# Patient Record
Sex: FEMALE | Race: WHITE | NOT HISPANIC OR LATINO | Employment: FULL TIME | URBAN - METROPOLITAN AREA
[De-identification: names, ages, dates, MRNs, and addresses within clinical notes are randomized per-mention and may not be internally consistent; named-entity substitution may affect disease eponyms.]

---

## 2017-03-01 ENCOUNTER — TELEPHONE (OUTPATIENT)
Dept: FAMILY MEDICINE | Facility: CLINIC | Age: 27
End: 2017-03-01

## 2017-03-01 ENCOUNTER — OFFICE VISIT (OUTPATIENT)
Dept: FAMILY MEDICINE | Facility: CLINIC | Age: 27
End: 2017-03-01
Payer: COMMERCIAL

## 2017-03-01 VITALS
OXYGEN SATURATION: 98 % | TEMPERATURE: 97.9 F | BODY MASS INDEX: 21.66 KG/M2 | HEART RATE: 108 BPM | WEIGHT: 130 LBS | HEIGHT: 65 IN

## 2017-03-01 DIAGNOSIS — R09.81 NASAL CONGESTION: ICD-10-CM

## 2017-03-01 DIAGNOSIS — R50.9 FEVER, UNSPECIFIED: ICD-10-CM

## 2017-03-01 DIAGNOSIS — J11.1 INFLUENZA-LIKE ILLNESS: Primary | ICD-10-CM

## 2017-03-01 DIAGNOSIS — R05.9 COUGH: ICD-10-CM

## 2017-03-01 DIAGNOSIS — R52 BODY ACHES: ICD-10-CM

## 2017-03-01 PROCEDURE — 99213 OFFICE O/P EST LOW 20 MIN: CPT | Performed by: PHYSICIAN ASSISTANT

## 2017-03-01 NOTE — MR AVS SNAPSHOT
After Visit Summary   3/1/2017    Skye Maciel    MRN: [id number]           Patient Information     Date Of Birth          1990        Visit Information        Provider Department      3/1/2017 2:40 PM Celine Mendez [facility]-C [facility]        Today's Diagnoses     Influenza-like illness    -  1    Fever, unspecified        Cough        Body aches        Nasal congestion          Care Instructions    This is likely influenza given daughter here today with same symptoms and positive test.  Given this pt declines further testing for herself.  This is a viral process and treatment is supportive.  Reviewed expected duration, potential complications, public health risks and hygiene measures.   Re-check anytime with failure to improve as expected or with new concerns.    Electronically Signed By: Celine Mendez PA-C        Follow-ups after your visit        Who to contact     If you have questions or need follow up information about today's clinic visit or your schedule please contact [facility] directly at [phone].  Normal or non-critical lab and imaging results will be communicated to you by [employer], letter or phone within 4 business days after the clinic has received the results. If you do not hear from us within 7 days, please contact the clinic through [employer] or phone. If you have a critical or abnormal lab result, we will notify you by phone as soon as possible.  Submit refill requests through [employer] or call your pharmacy and they will forward the refill request to us. Please allow 3 business days for your refill to be completed.          Additional Information About Your Visit        MyChart Information     [employer] gives you secure access to your electronic health record. If you see a primary care provider, you can also send messages to your care team and make appointments. If you have questions, please call your primary care clinic.  If you do  "not have a primary care provider, please call [phone] and they will assist you.        Care EveryWhere ID     This is your Care EveryWhere ID. This could be used by other organizations to access your [place] medical records  [id number]        Your Vitals Were     Pulse Temperature Height Pulse Oximetry Breastfeeding? BMI (Body Mass Index)    108 97.9  F (36.6  C) (Oral) 5' 5\" (1.651 m) 98% No 21.63 kg/m2       Blood Pressure from Last 3 Encounters:   11/30/16 114/82   01/15/16 122/88   11/11/15 120/70    Weight from Last 3 Encounters:   03/01/17 130 lb (59 kg)   11/30/16 139 lb (63 kg)   01/15/16 136 lb 11.2 oz (62 kg)              Today, you had the following     No orders found for display         Today's Medication Changes          These changes are accurate as of: 3/1/17 11:59 PM.  If you have any questions, ask your nurse or doctor.               Stop taking these medicines if you haven't already. Please contact your care team if you have questions.     NO ACTIVE MEDICATIONS   Stopped by:  Celine Mendez PA-C                    Primary Care Provider Office Phone # Fax #    Kemar Aguiar [phone]       [id number]  [facility] [id number]        Thank you!     Thank you for choosing [facility]  for your care. Our goal is always to provide you with excellent care. Hearing back from our patients is one way we can continue to improve our services. Please take a few minutes to complete the written survey that you may receive in the mail after your visit with us. Thank you!             Your Updated Medication List - Protect others around you: Learn how to safely use, store and throw away your medicines at www.disposemymeds.org.          This list is accurate as of: 3/1/17 11:59 PM.  Always use your most recent med list.                   Brand Name Dispense Instructions for use    ibuprofen 400-800 mg tablet    ADVIL,MOTRIN    90 tablet    Take 1-2 tablets by " mouth every 6 hours as needed (cramping).       norethindrone-ethinyl estradiol 1.5-30 MG-MCG per tablet    LOESTRIN 1.5/30 (21)    84 tablet    Take 1 tablet by mouth daily

## 2017-03-01 NOTE — PROGRESS NOTES
SUBJECTIVE:                                                    Skye Maciel is a 27 year old female who presents to clinic today for the following health issues:      Acute Illness   Acute illness concerns: cough, chlls, sore throat  Onset: since Sunday night and Monday morning - 103 max temp.  Occasionally smokes.      Fever: no    Chills/Sweats: YES    Headache (location?): YES    Sinus Pressure:YES    Conjunctivitis:  no    Ear Pain: no    Rhinorrhea: no    Congestion: YES    Sore Throat: YES     Cough: YES    Wheeze: no    Decreased Appetite: no    Nausea: no    Vomiting: no    Diarrhea:  no    Dysuria/Freq.: no    Fatigue/Achiness: YES    Sick/Strep Exposure: YES     Therapies Tried and outcome:     Problem list and histories reviewed & adjusted, as indicated.  Additional history: as documented    Patient Active Problem List   Diagnosis     Supervision of normal first pregnancy     CARDIOVASCULAR SCREENING; LDL GOAL LESS THAN 130     Encounter for supervision of other normal pregnancy     Papanicolaou smear of vagina with atypical squamous cells cannot exclude high grade squamous intraepithelial lesion (ASC-H)     Active labor     Postpartum state     Past Surgical History   Procedure Laterality Date     Hc tooth extraction w/forcep  5/06     wisdom teeth     Surgical history of -   4/07     jaw surgery       Social History   Substance Use Topics     Smoking status: Current Every Day Smoker     Packs/day: 0.25     Types: Cigarettes     Smokeless tobacco: Current User      Comment: Pt. smokes appx. 5 cigarettes daily, trying to quit while pregnant     Alcohol use Yes      Comment: occ     Family History   Problem Relation Age of Onset     Hypertension Father      C.A.D. Paternal Grandmother      MI     CANCER Paternal Aunt      ovarian, 50 yrs old         Current Outpatient Prescriptions   Medication Sig Dispense Refill     norethindrone-ethinyl estradiol (LOESTRIN 1.5/30, 21,) 1.5-30 MG-MCG per tablet  "Take 1 tablet by mouth daily (Patient not taking: Reported on 3/1/2017) 84 tablet 4     ibuprofen (ADVIL,MOTRIN) 400-800 mg tablet Take 1-2 tablets by mouth every 6 hours as needed (cramping). 90 tablet      Allergies   Allergen Reactions     No Known Drug Allergies        Reviewed and updated as needed this visit by clinical staff  Tobacco  Allergies  Meds  Med Hx  Surg Hx  Fam Hx  Soc Hx      Reviewed and updated as needed this visit by Provider  Tobacco  Allergies  Meds  Med Hx  Surg Hx  Fam Hx  Soc Hx        ROS:  Constitutional, HEENT, cardiovascular, pulmonary, gi and gu systems are negative, except as otherwise noted.    OBJECTIVE:                                                    Pulse 108  Temp 97.9  F (36.6  C) (Oral)  Ht 5' 5\" (1.651 m)  Wt 130 lb (59 kg)  SpO2 98%  Breastfeeding? No  BMI 21.63 kg/m2  Body mass index is 21.63 kg/(m^2).  GENERAL: healthy, alert and no distress  EYES: Eyes grossly normal to inspection, PERRL and conjunctivae and sclerae normal  HENT: ear canals and TM's normal, nose and mouth without ulcers or lesions. Mild congestion and rhinorrhea.  NECK: no adenopathy, no asymmetry, masses, or scars and thyroid normal to palpation  RESP: lungs clear to auscultation - no rales, rhonchi or wheezes  CV: regular rate and rhythm, normal S1 S2, no S3 or S4, no murmur, click or rub, no peripheral edema and peripheral pulses strong    Diagnostic Test Results:  none      ASSESSMENT/PLAN:                                                        ICD-10-CM    1. Influenza-like illness R69    2. Fever, unspecified R50.9    3. Cough R05    4. Body aches R52    5. Nasal congestion R09.81      See Patient Instructions  Patient Instructions   This is likely influenza given daughter here today with same symptoms and positive test.  Given this pt declines further testing for herself.  This is a viral process and treatment is supportive.  Reviewed expected duration, potential complications, " public health risks and hygiene measures.   Re-check anytime with failure to improve as expected or with new concerns.    Electronically Signed By: Celine Mendez PA-C

## 2017-03-01 NOTE — NURSING NOTE
"Chief Complaint   Patient presents with     URI       Initial Pulse 108  Temp 97.9  F (36.6  C) (Oral)  Ht 5' 5\" (1.651 m)  Wt 130 lb (59 kg)  SpO2 98%  Breastfeeding? No  BMI 21.63 kg/m2 Estimated body mass index is 21.63 kg/(m^2) as calculated from the following:    Height as of this encounter: 5' 5\" (1.651 m).    Weight as of this encounter: 130 lb (59 kg).  Medication Reconciliation: complete    "

## 2017-03-01 NOTE — TELEPHONE ENCOUNTER
Pt notes daughter having cold/sinus/cough sx and wanting both of them to be seen today for similar symptoms. Scheduled with [place] clinic today for evaluation. Daughter was triaged.     Home Care Instructions for cold symptoms:  Take a pain reliever of choice for fever and discomfort.  Do not give Aspirin to child.  Avoid acetaminophen if liver disease is present.  Avoid ibuprofen if kidney disease or stomach problems exist or with pregnancy.  Follow the directions on the label.    Rest  Drink 6-8 glasses of liquids daily, especially warm liquids such as tea with lemon and honey.  Take a decongestant of choice for congestion (unless history of hypertension)  Take expectorant of choice for cough.    Use saline nose drops as needed for nasal congestion.    Use a vaporizer or humidifier to keep air moist, especially at night, and change the water daily.  If throat is sore, gargle several times a day with warm water.  Use frozen cough drops or hard candy, or sip warm chicken broth for additional relief.  Avoid smoking and exposure to second-hand smoke.    Please report the following to the clinic:  Persistent fever greater than 3 days or temperature of 105 degrees F  Nasal discharge greater than 10 days  Persistent earache, sinus pain, or yellow eye drainage  Productive cough or fever  Condition persists or worsens    Seek Emergency care Immediately:  Difficulty breathing for reasons other than nasal congestion  Severe chest pain.    Please let us know if you have any further questions.  You can contact our clinic at [phone]    ERNESTO Tolentino RN

## 2017-06-03 ENCOUNTER — HEALTH MAINTENANCE LETTER (OUTPATIENT)
Age: 27
End: 2017-06-03

## 2017-06-27 ENCOUNTER — OFFICE VISIT (OUTPATIENT)
Dept: FAMILY MEDICINE | Facility: CLINIC | Age: 27
End: 2017-06-27
Payer: COMMERCIAL

## 2017-06-27 VITALS
TEMPERATURE: 98 F | HEART RATE: 100 BPM | DIASTOLIC BLOOD PRESSURE: 70 MMHG | BODY MASS INDEX: 21.99 KG/M2 | WEIGHT: 132 LBS | OXYGEN SATURATION: 100 % | SYSTOLIC BLOOD PRESSURE: 110 MMHG | HEIGHT: 65 IN

## 2017-06-27 DIAGNOSIS — H61.23 BILATERAL IMPACTED CERUMEN: Primary | ICD-10-CM

## 2017-06-27 PROCEDURE — 99213 OFFICE O/P EST LOW 20 MIN: CPT | Performed by: PHYSICIAN ASSISTANT

## 2017-06-27 NOTE — PATIENT INSTRUCTIONS
Please followup with ENT for an ear lavage.  Your left ear looks clear and normal appearing, the right ear still have cerumen to clear.      Please followup in clinic with any concerns.  Please be seen immediately if symptoms change or worsen in any way.

## 2017-06-27 NOTE — PROGRESS NOTES
"  SUBJECTIVE:                                                    Skye Maciel is a 27 year old female who presents to clinic today for the following health issues:      Concern - Ear pain  Onset: x2 days    Description:   Left ear - pressure    Intensity: moderate    Progression of Symptoms:  worsening and constant    Accompanying Signs & Symptoms:  nothing    Previous history of similar problem:   nothing    Precipitating factors:   Worsened by: touching    Alleviating factors:  Improved by: nothing    Therapies Tried and outcome: nothing    Patient reports that she has had ear pain of the left ear for the past 2-3 days.  She reports that it feels like pressure.  She does have some pain when touching the ear as well.      She denies fevers, chills or sweats.  She denies any other symptoms.      Problem list and histories reviewed & adjusted, as indicated.  Additional history: as documented      Health Maintenance reviewed - Pt gets paps done with Dr. Aguiar. - will make an appt.      ROS:  Constitutional, HEENT, cardiovascular, pulmonary, GI, , musculoskeletal, neuro, skin, endocrine and psych systems are negative, except as otherwise noted.    OBJECTIVE:                                                    /70 (BP Location: Left arm, Patient Position: Chair, Cuff Size: Adult Regular)  Pulse 100  Temp 98  F (36.7  C) (Oral)  Ht 5' 5\" (1.651 m)  Wt 132 lb (59.9 kg)  SpO2 100%  Breastfeeding? No  BMI 21.97 kg/m2  Body mass index is 21.97 kg/(m^2).  GENERAL: healthy, alert and no distress  EYES: Eyes grossly normal to inspection, PERRL and conjunctivae and sclerae normal  HENT: Impacted cerumen bilaterally, TM's not visualized on initial exam.  Left TM normal appearing without trauma or rupture after ear lavage, Right TM remains cerumen impacted.  Nose and mouth without ulcers or lesions  NECK: no adenopathy, no asymmetry, masses, or scars and thyroid normal to palpation  RESP: lungs clear to " auscultation - no rales, rhonchi or wheezes  CV: regular rate and rhythm, normal S1 S2, no S3 or S4, no murmur, click or rub, no peripheral edema and peripheral pulses strong  MS: no gross musculoskeletal defects noted, no edema  NEURO: Normal strength and tone, mentation intact and speech normal  PSYCH: mentation appears normal, affect normal/bright    Diagnostic Test Results:  none      ASSESSMENT/PLAN:                                                      Skye was seen today for otalgia.    Diagnoses and all orders for this visit:    Bilateral impacted cerumen  -     OTOLARYNGOLOGY REFERRAL  -     REMOVE IMPACTED CERUMEN      - On exam, patient was seen to have bilateral impacted cerumen.  Ear lavage was started.  Patient began to develop mild dizziness and nausea once cerumen was removed.  She was given an emesis bag and layed down.  Patient did not pass out, but was sweating and chilled.  BP was taken and was 100/72.  After about 5 minutes patient began to feel better, went to the restroom and was talking and communicating well.  She reported to feeling back to baseline and was ready for the right ear to be washed.  Right ear was not lavaged and referral was done to ENT for further management of impacted cerumen.  Patient had not eaten breakfast and therefore was given animal crackers and juice.  Patient self ambulated out of clinic without dizziness or unsteady gait.      - Patient reports an improvement in ear symptoms from when she came into clinic.    - Left ear was clear and without rupture or trauma from the ear wash.  Right ear still impacted with cerumen, but without symptoms, was advised to followup at ENT for further treatment.      - Patient to followup as advised in PP.      - Patient agreed with and understood the plan today.       See Patient Instructions:  Please followup with ENT for an ear lavage.  Your left ear looks clear and normal appearing, the right ear still have cerumen to clear.       Please followup in clinic with any concerns.  Please be seen immediately if symptoms change or worsen in any way.          Lexus Connor PA-C    [facility]

## 2017-06-27 NOTE — NURSING NOTE
"Chief Complaint   Patient presents with     Otalgia       Initial /70 (BP Location: Left arm, Patient Position: Chair, Cuff Size: Adult Regular)  Pulse 100  Temp 98  F (36.7  C) (Oral)  Ht 5' 5\" (1.651 m)  Wt 132 lb (59.9 kg)  SpO2 100%  Breastfeeding? No  BMI 21.97 kg/m2 Estimated body mass index is 21.97 kg/(m^2) as calculated from the following:    Height as of this encounter: 5' 5\" (1.651 m).    Weight as of this encounter: 132 lb (59.9 kg).  Medication Reconciliation: complete   Csaba Mlnarik CMA    "

## 2017-06-27 NOTE — MR AVS SNAPSHOT
After Visit Summary   6/27/2017    Skye Maciel    MRN: [id number]           Patient Information     Date Of Birth          1990        Visit Information        Provider Department      6/27/2017 8:00 AM Lexus Connor PA-C [facility]        Today's Diagnoses     Bilateral impacted cerumen    -  1      Care Instructions    Please followup with ENT for an ear lavage.  Your left ear looks clear and normal appearing, the right ear still have cerumen to clear.      Please followup in clinic with any concerns.  Please be seen immediately if symptoms change or worsen in any way.            Follow-ups after your visit        Additional Services     OTOLARYNGOLOGY REFERRAL       Your provider has referred you to: [facility]: [facility] [phone]   [email or web address]    Please be aware that coverage of these services is subject to the terms and limitations of your health insurance plan.  Call member services at your health plan with any benefit or coverage questions.      Please bring the following with you to your appointment:    (1) Any X-Rays, CTs or MRIs which have been performed.  Contact the facility where they were done to arrange for  prior to your scheduled appointment.   (2) List of current medications  (3) This referral request   (4) Any documents/labs given to you for this referral                  Who to contact     If you have questions or need follow up information about today's clinic visit or your schedule please contact [facility] directly at [phone].  Normal or non-critical lab and imaging results will be communicated to you by MyChart, letter or phone within 4 business days after the clinic has received the results. If you do not hear from us within 7 days, please contact the clinic through MyChart or phone. If you have a critical or abnormal lab  "result, we will notify you by phone as soon as possible.  Submit refill requests through [employer] or call your pharmacy and they will forward the refill request to us. Please allow 3 business days for your refill to be completed.          Additional Information About Your Visit        [employer] Information     [employer] gives you secure access to your electronic health record. If you see a primary care provider, you can also send messages to your care team and make appointments. If you have questions, please call your primary care clinic.  If you do not have a primary care provider, please call [phone] and they will assist you.        Care EveryWhere ID     This is your Care EveryWhere ID. This could be used by other organizations to access your [place] medical records  [id number]        Your Vitals Were     Pulse Temperature Height Pulse Oximetry Breastfeeding? BMI (Body Mass Index)    100 98  F (36.7  C) (Oral) 5' 5\" (1.651 m) 100% No 21.97 kg/m2       Blood Pressure from Last 3 Encounters:   06/27/17 110/70   11/30/16 114/82   01/15/16 122/88    Weight from Last 3 Encounters:   06/27/17 132 lb (59.9 kg)   03/01/17 130 lb (59 kg)   11/30/16 139 lb (63 kg)              We Performed the Following     OTOLARYNGOLOGY REFERRAL        Primary Care Provider Office Phone # Fax #    Kemar Aguiar [phone]       [id number]  [facility] [address]        Equal Access to Services     DANIEL [facility] AH: Hadii aad ku hadmacielo Soellen, waaxda luqadaha, qaybta kaalmada hopeyada, sharlene garcia. So [facility] [phone].    ATENCIÓN: Si habla español, tiene a pepe disposición servicios gratuitos de asistencia lingüística. Llame al [phone].    We comply with applicable federal civil rights laws and [place] laws. We do not discriminate on the basis of race, color, national origin, age, disability sex, sexual orientation or gender identity.            Thank you!     Thank you for " choosing [facility]  for your care. Our goal is always to provide you with excellent care. Hearing back from our patients is one way we can continue to improve our services. Please take a few minutes to complete the written survey that you may receive in the mail after your visit with us. Thank you!             Your Updated Medication List - Protect others around you: Learn how to safely use, store and throw away your medicines at www.disposemymeds.org.      Notice  As of 6/27/2017  8:52 AM    You have not been prescribed any medications.

## 2017-09-13 ENCOUNTER — TELEPHONE (OUTPATIENT)
Dept: FAMILY MEDICINE | Facility: CLINIC | Age: 27
End: 2017-09-13

## 2017-09-13 NOTE — TELEPHONE ENCOUNTER
Pt is past due for f/u pap smear.  Reminder letter was sent 02/22/17.  LMTC and schedule at [facility].  Left this writer's number in case of questions ([phone]).  If no reply and/or appt within 2 weeks (09/27/17) pt will be considered lost to pap tracking f/u.  Kaylie Valdivia,    Pap Tracking

## 2018-06-09 ENCOUNTER — HEALTH MAINTENANCE LETTER (OUTPATIENT)
Age: 28
End: 2018-06-09

## 2018-11-13 ENCOUNTER — OFFICE VISIT (OUTPATIENT)
Dept: FAMILY MEDICINE | Facility: CLINIC | Age: 28
End: 2018-11-13
Payer: COMMERCIAL

## 2018-11-13 VITALS
OXYGEN SATURATION: 99 % | SYSTOLIC BLOOD PRESSURE: 106 MMHG | WEIGHT: 139 LBS | BODY MASS INDEX: 23.13 KG/M2 | TEMPERATURE: 97.1 F | DIASTOLIC BLOOD PRESSURE: 66 MMHG | HEART RATE: 107 BPM

## 2018-11-13 DIAGNOSIS — R07.0 THROAT PAIN: Primary | ICD-10-CM

## 2018-11-13 DIAGNOSIS — J02.9 VIRAL PHARYNGITIS: ICD-10-CM

## 2018-11-13 LAB
DEPRECATED S PYO AG THROAT QL EIA: NORMAL
SPECIMEN SOURCE: NORMAL

## 2018-11-13 PROCEDURE — 99213 OFFICE O/P EST LOW 20 MIN: CPT | Performed by: FAMILY MEDICINE

## 2018-11-13 PROCEDURE — 87081 CULTURE SCREEN ONLY: CPT | Performed by: FAMILY MEDICINE

## 2018-11-13 PROCEDURE — 87880 STREP A ASSAY W/OPTIC: CPT | Performed by: FAMILY MEDICINE

## 2018-11-13 NOTE — PROGRESS NOTES
SUBJECTIVE:   Skye Maciel is a 28 year old female who presents to clinic today for the following health issues:      Acute Illness   Acute illness concerns: sore throat  Onset: 2 days    Fever: no    Chills/Sweats: YES    Headache (location?): YES    Sinus Pressure:no    Conjunctivitis:  no    Ear Pain: no    Rhinorrhea: no    Congestion: no    Sore Throat: YES     Cough: YES-non-productive    Wheeze: no    Decreased Appetite: YES    Nausea: no    Vomiting: no    Diarrhea:  no    Dysuria/Freq.: no    Fatigue/Achiness: YES    Sick/Strep Exposure: YES- kids are sick     Therapies Tried and outcome: dayquil        Problem list and histories reviewed & adjusted, as indicated.  Additional history: as documented    Patient Active Problem List   Diagnosis     Supervision of normal first pregnancy     CARDIOVASCULAR SCREENING; LDL GOAL LESS THAN 130     Encounter for supervision of other normal pregnancy     Papanicolaou smear of vagina with atypical squamous cells cannot exclude high grade squamous intraepithelial lesion (ASC-H)     Active labor     Postpartum state     Past Surgical History:   Procedure Laterality Date     HC TOOTH EXTRACTION W/FORCEP  5/06    wisdom teeth     SURGICAL HISTORY OF -   4/07    jaw surgery       Social History   Substance Use Topics     Smoking status: Current Every Day Smoker     Packs/day: 0.25     Types: Cigarettes     Smokeless tobacco: Current User      Comment: Pt. smokes appx. 5 cigarettes daily, trying to quit while pregnant     Alcohol use Yes      Comment: 0-5 drink per week     Family History   Problem Relation Age of Onset     Hypertension Father      C.A.D. Paternal Grandmother      MI     Cancer Paternal Aunt      ovarian, 50 yrs old           Reviewed and updated as needed this visit by clinical staff  Tobacco  Allergies  Meds  Problems       Reviewed and updated as needed this visit by Provider  Allergies  Meds  Problems         ROS:  Constitutional, HEENT,  cardiovascular, pulmonary, gi and gu systems are negative, except as otherwise noted.    OBJECTIVE:     /66  Pulse 107  Temp 97.1  F (36.2  C) (Tympanic)  Wt 139 lb (63 kg)  SpO2 99%  BMI 23.13 kg/m2  Body mass index is 23.13 kg/(m^2).  GENERAL: healthy, alert and no distress  HENT: ear canals and TM's normal, nose and mouth without ulcers or lesions  NECK: no adenopathy, no asymmetry, masses, or scars and thyroid normal to palpation  RESP: lungs clear to auscultation - no rales, rhonchi or wheezes  CV: regular rate and rhythm, normal S1 S2, no S3 or S4, no murmur, click or rub, no peripheral edema and peripheral pulses strong    Diagnostic Test Results:  Strep screen - Negative    ASSESSMENT/PLAN:   1. Throat pain  - Strep, Rapid Screen  - Beta strep group A culture    2. Viral pharyngitis: Rapid strep test negative. Continue symptomatic management -- PRN acetaminophen/ibuprofen, push fluids, salt water gargles, and rest. Follow up on culture.        Shun Carmona DO  [facility]

## 2018-11-13 NOTE — PATIENT INSTRUCTIONS
Viral Pharyngitis (Sore Throat)    You or your child have pharyngitis (sore throat). This infection is caused by a virus. It can cause throat pain that is worse when swallowing, aching all over, headache, and fever. The infection may be spread by coughing, kissing, or touching others after touching your mouth or nose. Antibiotic medicines do not work against viruses. They are not used for treating this illness.  Home care    If symptoms are severe, you or your child should rest at home. Return to work or school when you or your child feel well enough.     You or your child should drink plenty of fluids to prevent dehydration.    Use throat lozenges or numbing throat sprays to help reduce pain. Gargling with warm salt water will also help reduce throat pain. Dissolve 1/2 teaspoon of salt in 1 glass of warm water. Children can sip on juice or a popsicle. Children 5 years and older can also suck on a lollipop or hard candy.    Don t eat salty or spicy foods or give them to your child. These can be irritating to the throat.  Medicines for a child: You can give your child acetaminophen for fever, fussiness, or discomfort. In babies over 6 months of age, you may use ibuprofen instead of acetaminophen. If your child has chronic liver or kidney disease or ever had a stomach ulcer or GI bleeding, talk with your child s healthcare provider before giving these medicines. Aspirin should never be used by any child under 18 years of age who has a fever. It may cause severe liver damage.  Medicines for an adult: You may use acetaminophen or ibuprofen to control pain or fever, unless another medicine was prescribed for this. If you have chronic liver or kidney disease or ever had a stomach ulcer or GI bleeding, talk with your healthcare provider before using these medicines.  Follow-up care  Follow up with a healthcare provider or our staff if you or your child are not getting better over the next week.  When to seek medical  advice  Call your healthcare provider right away if any of these occur:    Fever as directed by your healthcare provider.  For children, seek care if:  ? Your child is of any age and has repeated fevers above 104 F (40 C).  ? Your child is younger than 2 years of age and has a fever of 100.4 F (38 C) for more than 1 day.  ? Your child is 2 years old or older and has a fever of 100.4 F (38 C) for more than 3 days.    New or worsening ear pain, sinus pain, or headache    Painful lumps in the back of neck    Stiff neck    Lymph nodes are getting larger    Can t swallow liquids, a lot of drooling, or can t open mouth wide due to throat pain    Signs of dehydration, such as very dark urine or no urine, sunken eyes, dizziness    Trouble breathing or noisy breathing    Muffled voice    New rash    Other symptoms are getting worse  Date Last Reviewed: 10/1/2017    6514-3984 [employer]. [address] [place] [address], [place] [address]. All rights reserved. This information is not intended as a substitute for professional medical care. Always follow your healthcare professional's instructions.         Home

## 2018-11-13 NOTE — MR AVS SNAPSHOT
After Visit Summary   11/13/2018    Skye Maciel    MRN: [id number]           Patient Information     Date Of Birth          1990        Visit Information        Provider Department      11/13/2018 11:00 AM Shun Carmona DO [facility] [place]        Today's Diagnoses     Throat pain    -  1      Care Instructions      Viral Pharyngitis (Sore Throat)    You or your child have pharyngitis (sore throat). This infection is caused by a virus. It can cause throat pain that is worse when swallowing, aching all over, headache, and fever. The infection may be spread by coughing, kissing, or touching others after touching your mouth or nose. Antibiotic medicines do not work against viruses. They are not used for treating this illness.  Home care    If symptoms are severe, you or your child should rest at home. Return to work or school when you or your child feel well enough.     You or your child should drink plenty of fluids to prevent dehydration.    Use throat lozenges or numbing throat sprays to help reduce pain. Gargling with warm salt water will also help reduce throat pain. Dissolve 1/2 teaspoon of salt in 1 glass of warm water. Children can sip on juice or a popsicle. Children 5 years and older can also suck on a lollipop or hard candy.    Don t eat salty or spicy foods or give them to your child. These can be irritating to the throat.  Medicines for a child: You can give your child acetaminophen for fever, fussiness, or discomfort. In babies over 6 months of age, you may use ibuprofen instead of acetaminophen. If your child has chronic liver or kidney disease or ever had a stomach ulcer or GI bleeding, talk with your child s healthcare provider before giving these medicines. Aspirin should never be used by any child under 18 years of age who has a fever. It may cause severe liver damage.  Medicines for an adult: You may use acetaminophen or ibuprofen to control pain or fever, unless  another medicine was prescribed for this. If you have chronic liver or kidney disease or ever had a stomach ulcer or GI bleeding, talk with your healthcare provider before using these medicines.  Follow-up care  Follow up with a healthcare provider or our staff if you or your child are not getting better over the next week.  When to seek medical advice  Call your healthcare provider right away if any of these occur:    Fever as directed by your healthcare provider.  For children, seek care if:  ? Your child is of any age and has repeated fevers above 104 F (40 C).  ? Your child is younger than 2 years of age and has a fever of 100.4 F (38 C) for more than 1 day.  ? Your child is 2 years old or older and has a fever of 100.4 F (38 C) for more than 3 days.    New or worsening ear pain, sinus pain, or headache    Painful lumps in the back of neck    Stiff neck    Lymph nodes are getting larger    Can t swallow liquids, a lot of drooling, or can t open mouth wide due to throat pain    Signs of dehydration, such as very dark urine or no urine, sunken eyes, dizziness    Trouble breathing or noisy breathing    Muffled voice    New rash    Other symptoms are getting worse  Date Last Reviewed: 10/1/2017    [id number] [employer]. [address] [address], [place] [address]. All rights reserved. This information is not intended as a substitute for professional medical care. Always follow your healthcare professional's instructions.                Follow-ups after your visit        Follow-up notes from your care team     Return in about 2 weeks (around 11/27/2018) for sore throat if not improving. .      Who to contact     If you have questions or need follow up information about today's clinic visit or your schedule please contact [facility] [place] directly at [phone].  Normal or non-critical lab and imaging results will be communicated to you by MyChart, letter or phone within 4 business days after the  clinic has received the results. If you do not hear from us within 7 days, please contact the clinic through [employer] or phone. If you have a critical or abnormal lab result, we will notify you by phone as soon as possible.  Submit refill requests through [employer] or call your pharmacy and they will forward the refill request to us. Please allow 3 business days for your refill to be completed.          Additional Information About Your Visit        [employer] Information     [employer] gives you secure access to your electronic health record. If you see a primary care provider, you can also send messages to your care team and make appointments. If you have questions, please call your primary care clinic.  If you do not have a primary care provider, please call [phone] and they will assist you.        Care EveryWhere ID     This is your Care EveryWhere ID. This could be used by other organizations to access your [facility] medical records  [id number]        Your Vitals Were     Pulse Temperature Pulse Oximetry BMI (Body Mass Index)          107 97.1  F (36.2  C) (Tympanic) 99% 23.13 kg/m2         Blood Pressure from Last 3 Encounters:   11/13/18 106/66   06/27/17 110/70   11/30/16 114/82    Weight from Last 3 Encounters:   11/13/18 139 lb (63 kg)   06/27/17 132 lb (59.9 kg)   03/01/17 130 lb (59 kg)              We Performed the Following     Beta strep group A culture     Strep, Rapid Screen        Primary Care Provider Office Phone # Fax #    Kemar Yuniel Aguiar [phone]       [address] [facility] [address]        Equal Access to Services     [facility] Center[facility] : Hadii aad ku hadasho Soomaali, waaxda luqadaha, qaybta kaalmada isidro, sharlene madera . So [facility] [phone].    ATENCIÓN: Si habla español, tiene a pepe disposición servicios gratuitos de asistencia lingüística. Llame al [phone].    We comply with applicable federal civil rights laws and [place] laws.  We do not discriminate on the basis of race, color, national origin, age, disability, sex, sexual orientation, or gender identity.            Thank you!     Thank you for choosing [facility]  for your care. Our goal is always to provide you with excellent care. Hearing back from our patients is one way we can continue to improve our services. Please take a few minutes to complete the written survey that you may receive in the mail after your visit with us. Thank you!             Your Updated Medication List - Protect others around you: Learn how to safely use, store and throw away your medicines at www.disposemymeds.org.      Notice  As of 11/13/2018 11:46 AM    You have not been prescribed any medications.

## 2018-11-15 LAB
BACTERIA SPEC CULT: NORMAL
SPECIMEN SOURCE: NORMAL

## 2018-12-13 ENCOUNTER — OFFICE VISIT (OUTPATIENT)
Dept: URGENT CARE | Facility: URGENT CARE | Age: 28
End: 2018-12-13
Payer: COMMERCIAL

## 2018-12-13 VITALS
BODY MASS INDEX: 23.3 KG/M2 | HEART RATE: 96 BPM | TEMPERATURE: 98.7 F | SYSTOLIC BLOOD PRESSURE: 110 MMHG | WEIGHT: 140 LBS | DIASTOLIC BLOOD PRESSURE: 68 MMHG | OXYGEN SATURATION: 96 %

## 2018-12-13 DIAGNOSIS — R07.0 THROAT PAIN: ICD-10-CM

## 2018-12-13 DIAGNOSIS — J06.9 VIRAL URI: Primary | ICD-10-CM

## 2018-12-13 LAB
DEPRECATED S PYO AG THROAT QL EIA: NORMAL
SPECIMEN SOURCE: NORMAL

## 2018-12-13 PROCEDURE — 87081 CULTURE SCREEN ONLY: CPT | Performed by: FAMILY MEDICINE

## 2018-12-13 PROCEDURE — 99213 OFFICE O/P EST LOW 20 MIN: CPT | Performed by: FAMILY MEDICINE

## 2018-12-13 PROCEDURE — 87880 STREP A ASSAY W/OPTIC: CPT | Performed by: FAMILY MEDICINE

## 2018-12-13 NOTE — PATIENT INSTRUCTIONS
We will call you if the strep culture returns positive    Take ibuprofen or tylenol as needed every 4-6 hours as needed for sore throat.   Honey based products or lozenges can help with the sore throat    Stay hydrated: 4-6 bottles of water a day      Expect symptoms to resolve within the next week     If a cough develops try robitussin/delsym in addition to honey in warm water

## 2018-12-13 NOTE — PROGRESS NOTES
Subjective:   Skye Maciel is a 28 year old female who presents for   Chief Complaint   Patient presents with     Urgent Care     Pharyngitis     sore throat and headache started 3 days ago, kids have had strep      Kids with strep twice recently. Just finished their courses of antibiotics.   Sore throat a few days ago. Has a headache and stomach discomfort with fatigue. Appetite has been so-so. Denies vomiting/diarrhea. No rashes of the body.       Patient Active Problem List    Diagnosis Date Noted     Postpartum state 09/29/2012     Priority: Medium     CARDIOVASCULAR SCREENING; LDL GOAL LESS THAN 130 10/31/2010     Priority: Medium     Papanicolaou smear of vagina with atypical squamous cells cannot exclude high grade squamous intraepithelial lesion (ASC-H) 07/13/2010     Priority: Medium     7/13/10 ASC-H pap  7/30/10 visual colp done  6/9/11 Normal colposcopy  4/6/12 normal pap  11/2015 Pap NIL, + HPV 16. Plan colp.  01/15/16 Colposcopy--ASCUS. Plan: pap/HPV in 1 year.  09/28/17 Would consider patient to be lost to follow-up.         No current outpatient medications on file.     No current facility-administered medications for this visit.      ROS:  As above per HPI    Objective:   /68   Pulse 96   Temp 98.7  F (37.1  C) (Oral)   Wt 63.5 kg (140 lb)   SpO2 96%   BMI 23.30 kg/m  , Body mass index is 23.3 kg/m .  Gen:  NAD, well-nourished, sitting in chair comfortably  HEENT: EOMI, sclera anicteric, Head normocephalic, ; nares patent; moist mucous membranes, tonsils 2+  Neck: trachea midline, no thyromegaly  CV:  Hemodynamically stable, RRR  Pulm:  no increased work of breathing , CTAB, no wheezes/rales/rhonchi   Extrem: no cyanosis, edema or clubbing  Skin: no obvious rashes or abnormalities  Psych: Euthymic, linear thoughts, normal rate of speech    Results for orders placed or performed in visit on 12/13/18   Strep, Rapid Screen   Result Value Ref Range    Specimen Description Throat      Rapid Strep A Screen       NEGATIVE: No Group A streptococcal antigen detected by immunoassay, await culture report.     Assessment & Plan:   Skye Maciel, 28 year old female who presents with:  Viral URI  Throat pain  Unremarkable exam and stable vital signs. Children no longer contagious for strep at this time. Will follow culture but doubt this returns positive. Follow-up as needed - symptomatic treatment recommended in AVS  - Strep, Rapid Screen  - Beta strep group A culture      Jono Mcmahan MD   [facility]     Options for treatment and/or follow-up care were reviewed with the patient. Skye Maciel and/or legal guardian was engaged and actively involved in the decision making process. Patient/guardian verbalized understanding of the options discussed and was satisfied with the final plan.

## 2018-12-14 LAB
BACTERIA SPEC CULT: NORMAL
SPECIMEN SOURCE: NORMAL

## 2019-02-06 ENCOUNTER — OFFICE VISIT (OUTPATIENT)
Dept: URGENT CARE | Facility: URGENT CARE | Age: 29
End: 2019-02-06
Payer: COMMERCIAL

## 2019-02-06 VITALS
TEMPERATURE: 99.6 F | HEART RATE: 88 BPM | BODY MASS INDEX: 23.61 KG/M2 | DIASTOLIC BLOOD PRESSURE: 70 MMHG | SYSTOLIC BLOOD PRESSURE: 120 MMHG | WEIGHT: 141.9 LBS | RESPIRATION RATE: 12 BRPM | OXYGEN SATURATION: 97 %

## 2019-02-06 DIAGNOSIS — R07.0 THROAT PAIN: Primary | ICD-10-CM

## 2019-02-06 LAB
DEPRECATED S PYO AG THROAT QL EIA: NORMAL
SPECIMEN SOURCE: NORMAL

## 2019-02-06 PROCEDURE — 99213 OFFICE O/P EST LOW 20 MIN: CPT | Performed by: PHYSICIAN ASSISTANT

## 2019-02-06 PROCEDURE — 87081 CULTURE SCREEN ONLY: CPT | Performed by: PHYSICIAN ASSISTANT

## 2019-02-06 PROCEDURE — 87880 STREP A ASSAY W/OPTIC: CPT | Performed by: PHYSICIAN ASSISTANT

## 2019-02-06 ASSESSMENT — ENCOUNTER SYMPTOMS
COUGH: 0
NAUSEA: 0
FEVER: 0
DIARRHEA: 0
VOMITING: 0
FATIGUE: 1
CHILLS: 0
SORE THROAT: 1

## 2019-02-06 NOTE — PROGRESS NOTES
SUBJECTIVE:   Skye Maciel is a 29 year old female presenting with a chief complaint of   Chief Complaint   Patient presents with     Urgent Care     URI     ST, fatigue, not feeling good x 2d       She is an established patient of [facility].    URI Adult    Onset of symptoms was 2 day(s) ago.  Course of illness is waxing and waning.    Severity moderate  Current and Associated symptoms: sore throat, congestion, fatigue  Denies cough, fever.  Treatment measures tried include Ibuprofen/tylenol  Predisposing factors include ill contact: Family member. Son and daughter with similar symptoms.        Review of Systems   Constitutional: Positive for fatigue. Negative for chills and fever.   HENT: Positive for sore throat.    Respiratory: Negative for cough.    Gastrointestinal: Negative for diarrhea, nausea and vomiting.       Past Medical History:   Diagnosis Date     Abnormal Pap smear, can't excl hi gd sq intraepithelial lesion (ASC-H) 7/13/10     Cervical high risk HPV (human papillomavirus) test positive 11/2015    NIL pap, + HPV 16     History of colposcopy with cervical biopsy 7/30/10, 6/9/11    no bx taken., WNL     Family History   Problem Relation Age of Onset     Hypertension Father      C.A.D. Paternal Grandmother         MI     Cancer Paternal Aunt         ovarian, 50 yrs old     No current outpatient medications on file.     Social History     Tobacco Use     Smoking status: Current Every Day Smoker     Packs/day: 0.25     Types: Cigarettes     Smokeless tobacco: Current User     Tobacco comment: Pt. smokes appx. 5 cigarettes daily, trying to quit while pregnant   Substance Use Topics     Alcohol use: Yes     Comment: 0-5 drink per week       OBJECTIVE  /70 (BP Location: Right arm, Patient Position: Sitting, Cuff Size: Adult Regular)   Pulse 88   Temp 99.6  F (37.6  C) (Tympanic)   Resp 12   Wt 64.4 kg (141 lb 14.4 oz)   SpO2 97%   BMI 23.61 kg/m      Physical Exam   Constitutional: She  appears well-developed and well-nourished. No distress.   HENT:   Head: Normocephalic and atraumatic.   Right Ear: Tympanic membrane normal.   Left Ear: Tympanic membrane normal.   Mouth/Throat: Oropharynx is clear and moist.   Eyes: Conjunctivae are normal.   Neck: Normal range of motion.   Cardiovascular: Regular rhythm and normal heart sounds.   Pulmonary/Chest: Effort normal and breath sounds normal. No respiratory distress.   Neurological: She is alert.   Skin: Skin is warm and dry.   Psychiatric: She has a normal mood and affect.       Labs:  Results for orders placed or performed in visit on 02/06/19 (from the past 24 hour(s))   Strep, Rapid Screen   Result Value Ref Range    Specimen Description Throat     Rapid Strep A Screen       NEGATIVE: No Group A streptococcal antigen detected by immunoassay, await culture report.           ASSESSMENT:      ICD-10-CM    1. Throat pain R07.0 Strep, Rapid Screen     Beta strep group A culture          PLAN:    Acute pharyngitis: Rapid strep is negative today.  Throat culture is pending.  Supportive care measures advised.  We will communicate any positive finding on the throat culture result.  Follow-up if any worsening symptoms.  Patient understands and agrees with the plan.      Followup:    If not improving or if condition worsens, follow up with your Primary Care Provider

## 2019-02-07 LAB
BACTERIA SPEC CULT: NORMAL
SPECIMEN SOURCE: NORMAL

## 2019-07-30 ENCOUNTER — TELEPHONE (OUTPATIENT)
Dept: FAMILY MEDICINE | Facility: CLINIC | Age: 29
End: 2019-07-30

## 2019-07-30 NOTE — TELEPHONE ENCOUNTER
Needs of attention regarding:  -Cervical Cancer Screening  -Wellness (Physical) Visit     Health Maintenance Topics with due status: Overdue       Topic Date Due    PREVENTIVE CARE VISIT 11/11/2016    PAP 01/15/2017    PHQ-2 01/01/2019    DTAP/TDAP/TD IMMUNIZATION 03/06/2019       Communication:  See Letter

## 2019-07-30 NOTE — LETTER
[facility]  [address]  [place] [id number] [phone]  July 30, 2019    Skye Maciel  [address] [place] [address]    Dear Skye,    I care about your health and have reviewed your health plan. I have reviewed your medical conditions, medication list, and lab results and am making recommendations based on this review, to better manage your health.    You are in particular need of attention regarding:  -Cervical Cancer Screening  -Wellness (Physical) Visit     I am recommending that you:  -schedule a WELLNESS (Physical) APPOINTMENT with me.   I will check fasting labs the same day - nothing to eat except water and meds for 8-10 hours prior.    -schedule a PAP SMEAR EXAM which is due.  Please disregard this reminder if you have had this exam elsewhere within the last year.  It would be helpful for us to have a copy of your recent pap smear report in our file so that we can best coordinate your care.    If you are under/uninsured, we recommend you contact the [facility]. They offer pap smears at no charge or on a sliding fee charge. You can schedule with them at [phone]. Please have them send us the results.      Here is a list of Health Maintenance topics that are due now or due soon:  Health Maintenance Due   Topic Date Due     PREVENTIVE CARE VISIT  11/11/2016     PAP  01/15/2017     PHQ-2  01/01/2019     DTAP/TDAP/TD IMMUNIZATION (4 - Td) 03/06/2019       Please call us at [phone] (or use [employer]) to address the above recommendations.     Thank you for trusting [facility] and we appreciate the opportunity to serve you.  We look forward to supporting your healthcare needs in the future.    Healthy Regards,    Shun Carmona, DO

## 2019-12-09 ENCOUNTER — HEALTH MAINTENANCE LETTER (OUTPATIENT)
Age: 29
End: 2019-12-09

## 2020-03-15 ENCOUNTER — HEALTH MAINTENANCE LETTER (OUTPATIENT)
Age: 30
End: 2020-03-15

## 2020-09-28 ENCOUNTER — OFFICE VISIT (OUTPATIENT)
Dept: OBGYN | Facility: CLINIC | Age: 30
End: 2020-09-28
Payer: COMMERCIAL

## 2020-09-28 VITALS — BODY MASS INDEX: 25.29 KG/M2 | SYSTOLIC BLOOD PRESSURE: 114 MMHG | WEIGHT: 152 LBS | DIASTOLIC BLOOD PRESSURE: 72 MMHG

## 2020-09-28 DIAGNOSIS — R87.810 ASCUS WITH POSITIVE HIGH RISK HPV CERVICAL: ICD-10-CM

## 2020-09-28 DIAGNOSIS — R87.610 ASCUS WITH POSITIVE HIGH RISK HPV CERVICAL: ICD-10-CM

## 2020-09-28 DIAGNOSIS — Z01.419 WELL WOMAN EXAM WITH ROUTINE GYNECOLOGICAL EXAM: Primary | ICD-10-CM

## 2020-09-28 DIAGNOSIS — Z12.4 SCREENING FOR CERVICAL CANCER: ICD-10-CM

## 2020-09-28 LAB
ERYTHROCYTE [DISTWIDTH] IN BLOOD BY AUTOMATED COUNT: 12.7 % (ref 10–15)
HBA1C MFR BLD: 4.9 % (ref 0–5.6)
HCT VFR BLD AUTO: 43.1 % (ref 35–47)
HGB BLD-MCNC: 14.2 G/DL (ref 11.7–15.7)
MCH RBC QN AUTO: 29.8 PG (ref 26.5–33)
MCHC RBC AUTO-ENTMCNC: 32.9 G/DL (ref 31.5–36.5)
MCV RBC AUTO: 91 FL (ref 78–100)
PLATELET # BLD AUTO: 306 10E9/L (ref 150–450)
RBC # BLD AUTO: 4.76 10E12/L (ref 3.8–5.2)
WBC # BLD AUTO: 6.8 10E9/L (ref 4–11)

## 2020-09-28 PROCEDURE — 85027 COMPLETE CBC AUTOMATED: CPT | Performed by: ADVANCED PRACTICE MIDWIFE

## 2020-09-28 PROCEDURE — 80061 LIPID PANEL: CPT | Performed by: ADVANCED PRACTICE MIDWIFE

## 2020-09-28 PROCEDURE — 83036 HEMOGLOBIN GLYCOSYLATED A1C: CPT | Performed by: ADVANCED PRACTICE MIDWIFE

## 2020-09-28 PROCEDURE — G0145 SCR C/V CYTO,THINLAYER,RESCR: HCPCS | Performed by: ADVANCED PRACTICE MIDWIFE

## 2020-09-28 PROCEDURE — 36415 COLL VENOUS BLD VENIPUNCTURE: CPT | Performed by: ADVANCED PRACTICE MIDWIFE

## 2020-09-28 PROCEDURE — 80048 BASIC METABOLIC PNL TOTAL CA: CPT | Performed by: ADVANCED PRACTICE MIDWIFE

## 2020-09-28 PROCEDURE — 87624 HPV HI-RISK TYP POOLED RSLT: CPT | Performed by: ADVANCED PRACTICE MIDWIFE

## 2020-09-28 PROCEDURE — 99395 PREV VISIT EST AGE 18-39: CPT | Performed by: ADVANCED PRACTICE MIDWIFE

## 2020-09-28 PROCEDURE — 84443 ASSAY THYROID STIM HORMONE: CPT | Performed by: ADVANCED PRACTICE MIDWIFE

## 2020-09-28 NOTE — PROGRESS NOTES
Skye is a 30 year old  female who presents for annual exam.     Besides routine health maintenance, she would like to discuss hair/skin changes.  HPI: Skye is here for her well-woman exam. She has not been seen for preventative care for about 4-5 years. Denies significant health changes in the time since, but does endorse dry, thinning hair and melasma on her upper lip and temples. Upper lip has improved with skin treatments. Also endorses slight weight gain; attributes this to situational stress due to Covid and adaptations to school/work.  Menses are regular q 28-30 days and heavy lasting 5 days. No significant dytsmenorrhea or clots.  Menses flow: Periods have gotten progressively heavier in the last couple years.    Patient's last menstrual period was 2020 (exact date)..   Using none for contraception. Tracking cycels/periodic abstinence.  She is not currently considering pregnancy.    REPRODUCTIVE/GYNECOLOGIC HISTORY:  Skye is sexually active with male partner(s) and is currently , in monogamous relationship.   STI testing offered?  Accepted   History of abnormal Pap smear:  Yes   11/11/15 NIL, +HPV 16, colp bx ASCUS, negative for malignancy  12 NIL  07/13/10 ASC-H colp WNL no bx    SOCIAL HISTORY  Abuse: does not report having previously been physical or sexually abused.    Do you feel safe in your environment? YES    She  reports that she has been smoking cigarettes. She uses smokeless tobacco. Previously reported smoking a quarter pack a day. Has now cut back to a few cigarettes a week. Congratulated patient on progress. States  has tobacco-free for two years which has helped immensely.    Last PHQ-9 score on record =   PHQ-9 SCORE 2012   PHQ-9 Total Score 2     Last GAD7 score on record = No flowsheet data found.  Alcohol Score = 0    HEALTH MAINTENANCE:  Cholesterol:  Cholesterol   Date Value Ref Range Status   2010 177 0 - 200 mg/dL Final     Comment:      LDL Cholesterol is the primary guide to therapy.   The NCEP recommends further evaluation of: patients with cholesterol <200   mg/dL   if additional risk factors are present, cholesterol >240 mg/dL, triglycerides   >150 mg/dL, or HDL <40 mg/dL.   2009 178 0 - 200 mg/dL Final     Comment:     LDL Cholesterol is the primary guide to therapy: LDL-cholesterol goal in high   risk patients is <100 mg/dL and in very high risk patients is <70 mg/dL.   The NCEP recommends further evaluation of: patients with cholesterol <200 mg/dL   if additional risk factors are present, cholesterol >240 mg/dL, triglycerides   >150 mg/dL, or HDL <40 mg/dL.   History of abnormal lipids: No  Mammo: No . History of abnormal Mammo: Not applicable.  Regular Self Breast Exams: No  TSH:   TSH   Date Value Ref Range Status   2010 1.39 0.4 - 5.0 mU/L Final      Pap:  Lab Results   Component Value Date    PAP NIL 2015    PAP NIL 2012    PAP WARNING! - Addended 2012       Immunizations up to date: no - due for Tdap, Flu, declines all today  (Gardasil, Tdap, Flu)  Health maintenance updated:  yes    HEALTHY HABITS  Eating habits: eats regular meals, follows a balanced nutrition diet and has adequate calcium intake  Calcium/Vitamin D intake: dairy, dietary supplement(s)  Exercise: How often do you exercise? Walking/running up to an hour a day  Have you had an eye exam in the last two years? YES   Do you routinely see the dentist once or twice yearly? YES        HISTORY:  OB History    Para Term  AB Living   2 2 2 0 0 2   SAB TAB Ectopic Multiple Live Births   0 0 0 0 2      # Outcome Date GA Lbr Tim/2nd Weight Sex Delivery Anes PTL Lv   2 Term 12 38w5d 04:40 / 01:04 3.487 kg (7 lb 11 oz) M Vag-Spont EPI N [place]      Name: Paco      Apgar1: 9     1 Term 11 39w0d 06:00 3.175 kg (7 lb) F    [place]      Birth Comments: System Generated. Please review and update pregnancy details.      Name:  Melanie      Obstetric Comments   No history of abnormal paps.     Past Medical History:   Diagnosis Date     Abnormal Pap smear, can't excl hi gd sq intraepithelial lesion (ASC-H) 7/13/10     Cervical high risk HPV (human papillomavirus) test positive 11/2015    NIL pap, + HPV 16     History of colposcopy with cervical biopsy 7/30/10, 6/9/11    no bx taken., WNL     Past Surgical History:   Procedure Laterality Date     HC TOOTH EXTRACTION W/FORCEP  5/06    wisdom teeth     SURGICAL HISTORY OF -   4/07    jaw surgery     Family History   Problem Relation Age of Onset     Hypertension Father      Heart Disease Father      Kidney Disease Mother      Liver Disease Mother      C.A.D. Paternal Grandmother         MI     Ovarian Cancer Paternal Aunt         50 yrs old at diagnosis     Social History     Socioeconomic History     Marital status:      Spouse name: Not on file     Number of children: 1     Years of education: Not on file     Highest education level: Not on file   Occupational History     Occupation: student     Comment: [employer]     Occupation: teller     Comment: [email or web address]     Employer: [employer]   Social Needs     Financial resource strain: Not on file     Food insecurity     Worry: Not on file     Inability: Not on file     Transportation needs     Medical: Not on file     Non-medical: Not on file   Tobacco Use     Smoking status: Current Every Day Smoker     Packs/day: 0.25     Types: Cigarettes     Smokeless tobacco: Current User       Substance and Sexual Activity     Alcohol use: Yes     Comment: 0-5 drink per week     Drug use: No     Sexual activity: Yes     Partners: Male     Birth control/protection: Pill   Lifestyle     Physical activity     Days per week: Not on file     Minutes per session: Not on file     Stress: Not on file   Relationships     Social connections     Talks on phone: Not on file     Gets together: Not on file     Attends [employer] service: Not on  file     Active member of club or organization: Not on file     Attends meetings of clubs or organizations: Not on file     Relationship status: Not on file     Intimate partner violence     Fear of current or ex partner: Not on file     Emotionally abused: Not on file     Physically abused: Not on file     Forced sexual activity: Not on file   Other Topics Concern      Service No     Blood Transfusions No     Caffeine Concern Yes     Comment: 1 cans pop/day     Occupational Exposure Not Asked     Hobby Hazards Not Asked     Sleep Concern Not Asked     Stress Concern Not Asked     Weight Concern Not Asked     Special Diet No     Comment: calcium daily, needs improvement     Back Care Not Asked     Exercise Yes     Comment: not regular     Bike Helmet Not Asked     Seat Belt Yes     Self-Exams Yes     Parent/sibling w/ CABG, MI or angioplasty before 65F 55M? Not Asked   Social History Narrative     Not on file     No current outpatient medications on file.   Takes a women's multivitamin every other day alternating with hair/skin/nails biotin supplement     No Known Allergies    Past medical, surgical, social and family history were reviewed and updated in EPIC.    ROS:   CONSTITUTIONAL: NEGATIVE for fever, chills, significant change in weight  INTEGUMENTARU/SKIN: POSITIVE for hair and skin changes as above  EYES: NEGATIVE for vision changes or irritation  ENT: NEGATIVE for ear, mouth and throat problems  RESP: NEGATIVE for significant cough or SOB  BREAST: NEGATIVE for masses, tenderness or discharge  CV: NEGATIVE for chest pain, palpitations or peripheral edema  GI: NEGATIVE for nausea, abdominal pain, heartburn, or change in bowel habits  : NEGATIVE for unusual urinary or vaginal symptoms. Periods are regular.  MUSCULOSKELETAL: NEGATIVE for significant arthralgias or myalgia  NEURO: NEGATIVE for weakness, dizziness or paresthesias  PSYCHIATRIC: NEGATIVE for changes in mood or affect    PHYSICAL EXAM:  BP  114/72 (BP Location: Left arm, Cuff Size: Adult Regular)   Wt 68.9 kg (152 lb)   LMP 09/18/2020 (Exact Date)   Breastfeeding No   BMI 25.29 kg/m     BMI: Body mass index is 25.29 kg/m .  Constitutional: healthy, alert and no distress  Integument: mild, diffuse darkening at temples consistent with melasma; upper lip not visualized d/t mask; no moles or lesions noted  Neck: symmetrical, thyroid normal size, no masses present, no lymphadenopathy present.   Cardiovascular: RRR, no murmurs present  Respiratory: breathing unlabored, lungs CTA bilaterally  Breast: normal without masses, tenderness or nipple discharge and no palpable axillary masses or adenopathy  Gastrointestinal: abdomen soft, non-tender, bowel sounds present  PELVIC EXAM:  Vulva: No lesions, no adenopathy, BUS WNL  Vagina: Moist, pink, discharge normal  well rugated, no lesions  Cervix: Smooth, pink with small nabothian cyst at 9 o'clock position, no CMT  Uterus: Normal size, non-tender, mobile  Ovaries/Adnexa: No masses palpated, non-tender  Rectal exam: deferred    ASSESSMENT/PLAN:    ICD-10-CM    1. Well woman exam with routine gynecological exam  Z01.419 Pap imaged thin layer screen with HPV - recommended age 30 - 65 years (select HPV order below)     HPV High Risk Types DNA Cervical     TSH with free T4 reflex     Lipid panel reflex to direct LDL Non-fasting     CBC with platelets     Basic metabolic panel  (Ca, Cl, CO2, Creat, Gluc, K, Na, BUN)     Hemoglobin A1c   2. ASCUS with positive high risk HPV cervical  R87.610 Pap imaged thin layer screen with HPV - recommended age 30 - 65 years (select HPV order below)    R87.810 HPV High Risk Types DNA Cervical   3. Screening for cervical cancer  Z12.4 Pap imaged thin layer screen with HPV - recommended age 30 - 65 years (select HPV order below)     HPV High Risk Types DNA Cervical     Results for orders placed or performed in visit on 09/28/20   CBC with platelets     Status: None   Result Value Ref  Range    WBC 6.8 4.0 - 11.0 10e9/L    RBC Count 4.76 3.8 - 5.2 10e12/L    Hemoglobin 14.2 11.7 - 15.7 g/dL    Hematocrit 43.1 35.0 - 47.0 %    MCV 91 78 - 100 fl    MCH 29.8 26.5 - 33.0 pg    MCHC 32.9 31.5 - 36.5 g/dL    RDW 12.7 10.0 - 15.0 %    Platelet Count 306 150 - 450 10e9/L     Return to clinic 1 year for well woman exam.       COUNSELING:   Reviewed preventive health counseling, as reflected in patient instructions  Special attention given to:        Regular exercise       Healthy diet/nutrition       Immunizations    Declined: Influenza and TDAP due to Other pt preference           Breast self-awareness    Odalis Mckinley, MARK, APRN, CNM

## 2020-09-28 NOTE — NURSING NOTE
"Chief Complaint   Patient presents with     Physical     Hormone changes? Skin darkening on her upper lip. States that her scalp is super dry and has tried OTC       Initial /72 (BP Location: Left arm, Cuff Size: Adult Regular)   Wt 68.9 kg (152 lb)   LMP 2020 (Exact Date)   Breastfeeding No   BMI 25.29 kg/m   Estimated body mass index is 25.29 kg/m  as calculated from the following:    Height as of 17: 1.651 m (5' 5\").    Weight as of this encounter: 68.9 kg (152 lb).  BP completed using cuff size: regular    Questioned patient about current smoking habits.  Pt. currently smokes.  Advised about smoking cessation.          The following HM Due: pap smear      Bijan Cruz MA    "

## 2020-09-29 LAB
ANION GAP SERPL CALCULATED.3IONS-SCNC: 7 MMOL/L (ref 3–14)
BUN SERPL-MCNC: 8 MG/DL (ref 7–30)
CALCIUM SERPL-MCNC: 9.1 MG/DL (ref 8.5–10.1)
CHLORIDE SERPL-SCNC: 105 MMOL/L (ref 94–109)
CHOLEST SERPL-MCNC: 179 MG/DL
CO2 SERPL-SCNC: 25 MMOL/L (ref 20–32)
CREAT SERPL-MCNC: 0.67 MG/DL (ref 0.52–1.04)
GFR SERPL CREATININE-BSD FRML MDRD: >90 ML/MIN/{1.73_M2}
GLUCOSE SERPL-MCNC: 78 MG/DL (ref 70–99)
HDLC SERPL-MCNC: 29 MG/DL
LDLC SERPL CALC-MCNC: 104 MG/DL
NONHDLC SERPL-MCNC: 150 MG/DL
POTASSIUM SERPL-SCNC: 3.7 MMOL/L (ref 3.4–5.3)
SODIUM SERPL-SCNC: 137 MMOL/L (ref 133–144)
TRIGL SERPL-MCNC: 230 MG/DL
TSH SERPL DL<=0.005 MIU/L-ACNC: 1.51 MU/L (ref 0.4–4)

## 2020-10-02 LAB
COPATH REPORT: NORMAL
PAP: NORMAL

## 2020-10-05 LAB
FINAL DIAGNOSIS: NORMAL
HPV HR 12 DNA CVX QL NAA+PROBE: NEGATIVE
HPV16 DNA SPEC QL NAA+PROBE: NEGATIVE
HPV18 DNA SPEC QL NAA+PROBE: NEGATIVE
SPECIMEN DESCRIPTION: NORMAL
SPECIMEN SOURCE CVX/VAG CYTO: NORMAL

## 2021-01-14 ENCOUNTER — HEALTH MAINTENANCE LETTER (OUTPATIENT)
Age: 31
End: 2021-01-14

## 2021-10-24 ENCOUNTER — HEALTH MAINTENANCE LETTER (OUTPATIENT)
Age: 31
End: 2021-10-24

## 2021-12-19 ENCOUNTER — HEALTH MAINTENANCE LETTER (OUTPATIENT)
Age: 31
End: 2021-12-19

## 2022-10-15 ENCOUNTER — HEALTH MAINTENANCE LETTER (OUTPATIENT)
Age: 32
End: 2022-10-15

## 2023-03-26 ENCOUNTER — HEALTH MAINTENANCE LETTER (OUTPATIENT)
Age: 33
End: 2023-03-26

## 2024-05-26 ENCOUNTER — HEALTH MAINTENANCE LETTER (OUTPATIENT)
Age: 34
End: 2024-05-26